# Patient Record
Sex: MALE | Race: WHITE | Employment: UNEMPLOYED | URBAN - METROPOLITAN AREA
[De-identification: names, ages, dates, MRNs, and addresses within clinical notes are randomized per-mention and may not be internally consistent; named-entity substitution may affect disease eponyms.]

---

## 2018-07-17 ENCOUNTER — OFFICE VISIT (OUTPATIENT)
Dept: INTERNAL MEDICINE CLINIC | Age: 53
End: 2018-07-17

## 2018-07-17 ENCOUNTER — HOSPITAL ENCOUNTER (OUTPATIENT)
Dept: LAB | Age: 53
Discharge: HOME | End: 2018-07-17
Payer: SELF-PAY

## 2018-07-17 VITALS
DIASTOLIC BLOOD PRESSURE: 85 MMHG | TEMPERATURE: 97.7 F | RESPIRATION RATE: 18 BRPM | BODY MASS INDEX: 19.65 KG/M2 | OXYGEN SATURATION: 99 % | HEIGHT: 67 IN | WEIGHT: 125.2 LBS | SYSTOLIC BLOOD PRESSURE: 140 MMHG | HEART RATE: 68 BPM

## 2018-07-17 DIAGNOSIS — Z00.00 ROUTINE GENERAL MEDICAL EXAMINATION AT A HEALTH CARE FACILITY: Primary | ICD-10-CM

## 2018-07-17 DIAGNOSIS — R63.4 WEIGHT LOSS: ICD-10-CM

## 2018-07-17 DIAGNOSIS — R68.82 DECREASED LIBIDO: ICD-10-CM

## 2018-07-17 DIAGNOSIS — R05.9 COUGH: ICD-10-CM

## 2018-07-17 DIAGNOSIS — Z12.11 SCREEN FOR COLON CANCER: ICD-10-CM

## 2018-07-17 DIAGNOSIS — R61 NIGHT SWEATS: ICD-10-CM

## 2018-07-17 LAB
ALBUMIN SERPL-MCNC: 4.3 G/DL (ref 3.4–5)
ALBUMIN/GLOB SERPL: 1.3 {RATIO} (ref 0.8–1.7)
ALP SERPL-CCNC: 68 U/L (ref 45–117)
ALT SERPL-CCNC: 26 U/L (ref 16–61)
ANION GAP SERPL CALC-SCNC: 7 MMOL/L (ref 3–18)
AST SERPL-CCNC: 18 U/L (ref 15–37)
BASOPHILS # BLD: 0 K/UL (ref 0–0.06)
BASOPHILS NFR BLD: 1 % (ref 0–2)
BILIRUB SERPL-MCNC: 1.3 MG/DL (ref 0.2–1)
BUN SERPL-MCNC: 19 MG/DL (ref 7–18)
BUN/CREAT SERPL: 20 (ref 12–20)
CALCIUM SERPL-MCNC: 9 MG/DL (ref 8.5–10.1)
CHLORIDE SERPL-SCNC: 104 MMOL/L (ref 100–108)
CO2 SERPL-SCNC: 30 MMOL/L (ref 21–32)
CREAT SERPL-MCNC: 0.93 MG/DL (ref 0.6–1.3)
DIFFERENTIAL METHOD BLD: NORMAL
EOSINOPHIL # BLD: 0.1 K/UL (ref 0–0.4)
EOSINOPHIL NFR BLD: 1 % (ref 0–5)
ERYTHROCYTE [DISTWIDTH] IN BLOOD BY AUTOMATED COUNT: 12.8 % (ref 11.6–14.5)
GLOBULIN SER CALC-MCNC: 3.2 G/DL (ref 2–4)
GLUCOSE SERPL-MCNC: 101 MG/DL (ref 74–99)
HCT VFR BLD AUTO: 45.7 % (ref 36–48)
HGB BLD-MCNC: 15.9 G/DL (ref 13–16)
LYMPHOCYTES # BLD: 2.7 K/UL (ref 0.9–3.6)
LYMPHOCYTES NFR BLD: 41 % (ref 21–52)
MCH RBC QN AUTO: 33.2 PG (ref 24–34)
MCHC RBC AUTO-ENTMCNC: 34.8 G/DL (ref 31–37)
MCV RBC AUTO: 95.4 FL (ref 74–97)
MONOCYTES # BLD: 0.5 K/UL (ref 0.05–1.2)
MONOCYTES NFR BLD: 8 % (ref 3–10)
NEUTS SEG # BLD: 3.2 K/UL (ref 1.8–8)
NEUTS SEG NFR BLD: 49 % (ref 40–73)
PLATELET # BLD AUTO: 195 K/UL (ref 135–420)
PMV BLD AUTO: 11.4 FL (ref 9.2–11.8)
POTASSIUM SERPL-SCNC: 4.2 MMOL/L (ref 3.5–5.5)
PROT SERPL-MCNC: 7.5 G/DL (ref 6.4–8.2)
RBC # BLD AUTO: 4.79 M/UL (ref 4.7–5.5)
SODIUM SERPL-SCNC: 141 MMOL/L (ref 136–145)
WBC # BLD AUTO: 6.4 K/UL (ref 4.6–13.2)

## 2018-07-17 PROCEDURE — 80053 COMPREHEN METABOLIC PANEL: CPT | Performed by: INTERNAL MEDICINE

## 2018-07-17 PROCEDURE — 85025 COMPLETE CBC W/AUTO DIFF WBC: CPT | Performed by: INTERNAL MEDICINE

## 2018-07-17 PROCEDURE — 84403 ASSAY OF TOTAL TESTOSTERONE: CPT | Performed by: INTERNAL MEDICINE

## 2018-07-17 PROCEDURE — 80061 LIPID PANEL: CPT | Performed by: INTERNAL MEDICINE

## 2018-07-17 PROCEDURE — 84439 ASSAY OF FREE THYROXINE: CPT | Performed by: INTERNAL MEDICINE

## 2018-07-17 RX ORDER — EMTRICITABINE AND TENOFOVIR DISOPROXIL FUMARATE 200; 300 MG/1; MG/1
1 TABLET, FILM COATED ORAL DAILY
COMMUNITY
End: 2020-05-14

## 2018-07-17 RX ORDER — IBUPROFEN 200 MG
200 TABLET ORAL
COMMUNITY

## 2018-07-17 RX ORDER — SILDENAFIL 100 MG/1
100 TABLET, FILM COATED ORAL AS NEEDED
Qty: 5 TAB | Refills: 2 | Status: SHIPPED | OUTPATIENT
Start: 2018-07-17 | End: 2018-08-21 | Stop reason: SDUPTHER

## 2018-07-17 NOTE — MR AVS SNAPSHOT
[address] [place] [address] 
 
 
 [address] [facility] [address] 
[phone] Patient: Maynor Donald MRN: [id number] :1965 Visit Information Date & Time Provider Department Dept. Phone Encounter #  
 2018  2:30 PM [facility] [phone] [id number] Follow-up Instructions Return in about 1 month (around 2018) for reveiw lab, new med, and weight. Upcoming Health Maintenance Date Due Hepatitis C Screening 1965 FOBT Q 1 YEAR AGE 50-75 2014 Influenza Age 5 to Adult 2018 DTaP/Tdap/Td series (2 - Td) 2019 Allergies as of 2018  Review Complete On: 2018 By: Porsha Hill MD  
 No Known Allergies Current Immunizations  Reviewed on 2018 Name Date Hep B Vaccine 2005 Tdap 2009 Reviewed by Redd Harrell PHARMD on 2018 at 10:31 AM  
You Were Diagnosed With   
  
 Codes Comments Weight loss    -  Primary ICD-10-CM: R63.4 ICD-9-CM: 783.21 Night sweats     ICD-10-CM: R61 
ICD-9-CM: 780.8 Decreased libido     ICD-10-CM: D38.59 
ICD-9-CM: 799.81 Cough     ICD-10-CM: R05 ICD-9-CM: 392. 2 Vitals BP Pulse Temp Resp Height(growth percentile) Weight(growth percentile) 140/85 (BP 1 Location: Right arm, BP Patient Position: Sitting) 68 97.7 °F (36.5 °C) (Oral) 18 5' 7\" (1.702 m) 125 lb 3.2 oz (56.8 kg) SpO2 BMI Smoking Status 99% 19.61 kg/m2 Never Smoker Vitals History BMI and BSA Data Body Mass Index Body Surface Area  
 19.61 kg/m 2 1.64 m 2 Preferred Pharmacy Pharmacy Name Phone [address] [place] [address] [phone] Your Updated Medication List  
  
   
This list is accurate as of 18  3:09 PM.  Always use your most recent med list.  
  
  
  
  
 ibuprofen 200 mg tablet Commonly known as:  MOTRIN  
 Take 200 mg by mouth every six (6) hours as needed for Pain.  
  
 sildenafil citrate 100 mg tablet Commonly known as:  VIAGRA Take 1 Tab by mouth as needed. Indications: Erectile Dysfunction TRUVADA 200-300 mg per tablet Generic drug:  emtricitabine-tenofovir (TDF) Take 1 Tab by mouth daily. Prescriptions Printed Refills  
 sildenafil citrate (VIAGRA) 100 mg tablet 2 Sig: Take 1 Tab by mouth as needed. Indications: Erectile Dysfunction Class: Print Route: Oral  
  
Follow-up Instructions Return in about 1 month (around 8/17/2018) for reveiw lab, new med, and weight. To-Do List   
 07/17/2018 Lab:  CBC WITH AUTOMATED DIFF   
  
 07/17/2018 Lab:  LIPID PANEL   
  
 07/17/2018 Lab:  METABOLIC PANEL, COMPREHENSIVE   
  
 07/17/2018 Lab:  QUANTIFERON TB GOLD(CLIENT INCUB.)   
  
 07/17/2018 Lab:  TESTOSTERONE, FREE & TOTAL   
  
 07/17/2018 Lab:  TSH AND FREE T4   
  
 07/17/2018 Imaging:  XR CHEST PA LAT Introducing [facility]! Sandi Fletcher introduces [employer] patient portal. Now you can access parts of your medical record, email your doctor's office, and request medication refills online. 1. In your internet browser, go to [email or web address] 2. Click on the First Time User? Click Here link in the Sign In box. You will see the New Member Sign Up page. 3. Enter your [employer] Access Code exactly as it appears below. You will not need to use this code after youve completed the sign-up process. If you do not sign up before the expiration date, you must request a new code. · [employer] Access Code: [email or web address] Expires: 10/15/2018  3:09 PM 
 
4. Enter the last four digits of your Social Security Number (xxxx) and Date of Birth (mm/dd/yyyy) as indicated and click Submit. You will be taken to the next sign-up page. 5. Create a [employer] ID.  This will be your [employer] login ID and cannot be changed, so think of one that is secure and easy to remember. 6. Create a [employer] password. You can change your password at any time. 7. Enter your Password Reset Question and Answer. This can be used at a later time if you forget your password. 8. Enter your e-mail address. You will receive e-mail notification when new information is available in [address] 9. Click Sign Up. You can now view and download portions of your medical record. 10. Click the Download Summary menu link to download a portable copy of your medical information. If you have questions, please visit the Frequently Asked Questions section of the [employer] website. Remember, [employer] is NOT to be used for urgent needs. For medical emergencies, dial 911. Now available from your iPhone and Android! Please provide this summary of care documentation to your next provider. If you have any questions after today's visit, please call [phone].

## 2018-07-17 NOTE — PROGRESS NOTES
ROOM # 4    Noa Flynn presents today for   Chief Complaint   Patient presents with   [address] Weight Loss    Sweats       Noa Flynn preferred language for health care discussion is english/other. Is someone accompanying this pt? no    Is the patient using any DME equipment during OV? no    Depression Screening:  PHQ over the last two weeks 7/17/2018   Little interest or pleasure in doing things Not at all   Feeling down, depressed or hopeless Not at all   Total Score PHQ 2 0       Learning Assessment:  Learning Assessment 7/17/2018   PRIMARY LEARNER Patient   HIGHEST LEVEL OF EDUCATION - PRIMARY LEARNER  SOME COLLEGE   BARRIERS PRIMARY LEARNER NONE   CO-LEARNER CAREGIVER No   PRIMARY LANGUAGE ENGLISH   LEARNER PREFERENCE PRIMARY VIDEOS   ANSWERED BY Patient   RELATIONSHIP SELF       Abuse Screening:  Abuse Screening Questionnaire 7/17/2018   Do you ever feel afraid of your partner? N   Are you in a relationship with someone who physically or mentally threatens you? N   Is it safe for you to go home? Y       Fall Risk  Fall Risk Assessment, last 12 mths 7/17/2018   Able to walk? Yes   Fall in past 12 months? No       Health Maintenance reviewed and discussed per provider. Yes    Noa Flynn is due for   Health Maintenance Due   Topic Date Due    Hepatitis C Screening  1965    FOBT Q 1 YEAR AGE 50-75  08/18/2014     Please order/place referral if appropriate. Advance Directive:  1. Do you have an advance directive in place? Patient Reply: no    2. If not, would you like material regarding how to put one in place? Patient Reply: no    Coordination of Care:  1. Have you been to the ER, urgent care clinic since your last visit? Hospitalized since your last visit? no    2. Have you seen or consulted any other health care providers outside of the [address] [place] [address] since your last visit? Include any pap smears or colon screening.  no

## 2018-07-18 LAB
CHOLEST SERPL-MCNC: 132 MG/DL
HDLC SERPL-MCNC: 65 MG/DL (ref 40–60)
HDLC SERPL: 2 {RATIO} (ref 0–5)
LDLC SERPL CALC-MCNC: 57.8 MG/DL (ref 0–100)
LIPID PROFILE,FLP: ABNORMAL
T4 FREE SERPL-MCNC: 1.3 NG/DL (ref 0.7–1.5)
TRIGL SERPL-MCNC: 46 MG/DL (ref ?–150)
TSH SERPL DL<=0.05 MIU/L-ACNC: 1.4 UIU/ML (ref 0.36–3.74)
VLDLC SERPL CALC-MCNC: 9.2 MG/DL

## 2018-07-19 ENCOUNTER — HOSPITAL ENCOUNTER (OUTPATIENT)
Dept: GENERAL RADIOLOGY | Age: 53
Discharge: HOME | End: 2018-07-19
Payer: SELF-PAY

## 2018-07-19 DIAGNOSIS — R63.4 WEIGHT LOSS: ICD-10-CM

## 2018-07-19 DIAGNOSIS — R05.9 COUGH: ICD-10-CM

## 2018-07-19 DIAGNOSIS — R61 NIGHT SWEATS: ICD-10-CM

## 2018-07-19 PROCEDURE — 71046 X-RAY EXAM CHEST 2 VIEWS: CPT

## 2018-07-21 LAB
TESTOST FREE SERPL-MCNC: 12.1 PG/ML (ref 7.2–24)
TESTOST SERPL-MCNC: 362 NG/DL (ref 264–916)

## 2018-07-23 ENCOUNTER — TELEPHONE (OUTPATIENT)
Dept: INTERNAL MEDICINE CLINIC | Age: 53
End: 2018-07-23

## 2018-07-23 NOTE — TELEPHONE ENCOUNTER
Incoming call from pt. 2 pt identifiers confirmed. Pt notified that xray looked fine and letter was sent to him about this from Dr. Desiree Bower. Also notified that testosterone was fine and most of other labs looked fine as well. Advised that Dr. Desiree Bower will be sending him a letter regarding lab results. Pt verbalized understanding of all information. No further questions or concerns at this time.

## 2018-07-23 NOTE — TELEPHONE ENCOUNTER
Xray report is in the mail. It was normal  The remainder of his lab looked good. I was waiting on the quantiferon before sending it. Since it is delayed, I will send a letter soon.   His testosterone was fine

## 2018-07-23 NOTE — TELEPHONE ENCOUNTER
----- Message from Osmar Huizar MD sent at 7/20/2018  3:56 PM EDT -----  Regarding: quantiferon  Any idea what happened re this quantiferon?  ----- Message -----     From: Edward Ledezma In [place]     Sent: 7/17/2018   9:29 PM       To:  Osmar Huizar MD

## 2018-07-23 NOTE — TELEPHONE ENCOUNTER
Spoke with staff that was in office that day, correct tubes for Quantiferon test were not collected as nurse was unaware. Contacted pt. 2 pt identifiers confirmed. Pt notified needs to come in to have test drawn. Pt verbalized understanding and states will be in the office within 2 weeks to have this done. Verbalized understanding. No further questions or concerns at this time.

## 2018-07-24 ENCOUNTER — HOSPITAL ENCOUNTER (OUTPATIENT)
Dept: LAB | Age: 53
Discharge: HOME | End: 2018-07-24
Payer: SELF-PAY

## 2018-07-24 DIAGNOSIS — Z12.11 SCREEN FOR COLON CANCER: ICD-10-CM

## 2018-07-24 PROCEDURE — 82274 ASSAY TEST FOR BLOOD FECAL: CPT | Performed by: INTERNAL MEDICINE

## 2018-07-30 ENCOUNTER — HOSPITAL ENCOUNTER (OUTPATIENT)
Dept: LAB | Age: 53
Discharge: HOME | End: 2018-07-30
Payer: SELF-PAY

## 2018-07-30 ENCOUNTER — TELEPHONE (OUTPATIENT)
Dept: INTERNAL MEDICINE CLINIC | Age: 53
End: 2018-07-30

## 2018-07-30 DIAGNOSIS — Z11.1 SCREENING FOR TUBERCULOSIS: Primary | ICD-10-CM

## 2018-07-30 DIAGNOSIS — R61 UNEXPLAINED NIGHT SWEATS: ICD-10-CM

## 2018-07-30 DIAGNOSIS — Z11.1 SCREENING FOR TUBERCULOSIS: ICD-10-CM

## 2018-07-30 PROCEDURE — 86480 TB TEST CELL IMMUN MEASURE: CPT | Performed by: INTERNAL MEDICINE

## 2018-07-30 PROCEDURE — 36415 COLL VENOUS BLD VENIPUNCTURE: CPT | Performed by: INTERNAL MEDICINE

## 2018-07-31 LAB — HEMOCCULT STL QL IA: NEGATIVE

## 2018-08-02 LAB
ANNOTATION COMMENT IMP: NORMAL
M TB IFN-G CD4+ BCKGRND COR BLD-ACNC: 0 IU/ML
M TB IFN-G CD4+ T-CELLS BLD-ACNC: 0.02 IU/ML
M TB TUBERC IFN-G BLD QL: NEGATIVE
M TB TUBERC IGNF/MITOGEN IGNF CONTROL: >10 IU/ML
QUANTIFERON NIL VALUE: 0.02 IU/ML
SERVICE CMNT-IMP: NORMAL

## 2018-08-21 ENCOUNTER — OFFICE VISIT (OUTPATIENT)
Dept: INTERNAL MEDICINE CLINIC | Age: 53
End: 2018-08-21

## 2018-08-21 VITALS
WEIGHT: 127.6 LBS | SYSTOLIC BLOOD PRESSURE: 123 MMHG | OXYGEN SATURATION: 96 % | RESPIRATION RATE: 18 BRPM | BODY MASS INDEX: 20.03 KG/M2 | HEIGHT: 67 IN | DIASTOLIC BLOOD PRESSURE: 76 MMHG | HEART RATE: 74 BPM | TEMPERATURE: 97.7 F

## 2018-08-21 DIAGNOSIS — R68.82 DECREASED LIBIDO: ICD-10-CM

## 2018-08-21 DIAGNOSIS — R63.4 WEIGHT LOSS: Primary | ICD-10-CM

## 2018-08-21 DIAGNOSIS — M53.3 SI (SACROILIAC) JOINT DYSFUNCTION: ICD-10-CM

## 2018-08-21 DIAGNOSIS — K21.9 GASTROESOPHAGEAL REFLUX DISEASE, ESOPHAGITIS PRESENCE NOT SPECIFIED: ICD-10-CM

## 2018-08-21 RX ORDER — SILDENAFIL 100 MG/1
100 TABLET, FILM COATED ORAL AS NEEDED
Qty: 15 TAB | Refills: 2 | Status: SHIPPED | OUTPATIENT
Start: 2018-08-21 | End: 2019-06-06

## 2018-08-21 NOTE — PROGRESS NOTES
ROOM # 6    Foster Nunes presents today for   Chief Complaint   Patient presents with    Results    Weight Loss       Foster Nunes preferred language for health care discussion is english/other. Is someone accompanying this pt? no    Is the patient using any DME equipment during OV? no    Depression Screening:  PHQ over the last two weeks 7/17/2018   Little interest or pleasure in doing things Not at all   Feeling down, depressed, irritable, or hopeless Not at all   Total Score PHQ 2 0       Learning Assessment:  Learning Assessment 7/17/2018   PRIMARY LEARNER Patient   HIGHEST LEVEL OF EDUCATION - PRIMARY LEARNER  SOME COLLEGE   BARRIERS PRIMARY LEARNER NONE   CO-LEARNER CAREGIVER No   PRIMARY LANGUAGE ENGLISH   LEARNER PREFERENCE PRIMARY VIDEOS   ANSWERED BY Patient   RELATIONSHIP SELF       Abuse Screening:  Abuse Screening Questionnaire 7/17/2018   Do you ever feel afraid of your partner? N   Are you in a relationship with someone who physically or mentally threatens you? N   Is it safe for you to go home? Y       Fall Risk  Fall Risk Assessment, last 12 mths 7/17/2018   Able to walk? Yes   Fall in past 12 months? No       Health Maintenance reviewed and discussed per provider. Yes    Foster Nuens is due for   Health Maintenance Due   Topic Date Due    Hepatitis C Screening  1965    Influenza Age 5 to Adult  08/01/2018     Please order/place referral if appropriate. Advance Directive:  1. Do you have an advance directive in place? Patient Reply: no    2. If not, would you like material regarding how to put one in place? Patient Reply: no    Coordination of Care:  1. Have you been to the ER, urgent care clinic since your last visit? Hospitalized since your last visit? no    2. Have you seen or consulted any other health care providers outside of the [facility] since your last visit? Include any pap smears or colon screening.  no

## 2018-08-21 NOTE — PROGRESS NOTES
HISTORY OF PRESENT ILLNESS  Yessica Suazo is a 46 y.o. male. Visit Vitals    /76 (BP 1 Location: Right arm, BP Patient Position: Sitting)    Pulse 74    Temp 97.7 °F (36.5 °C) (Oral)    Resp 18    Ht 5' 7\" (1.702 m)    Wt 127 lb 9.6 oz (57.9 kg)    SpO2 96%    BMI 19.98 kg/m2       HPI Comments: He is still concerned about weight loss-but today he is up 2 #  He is increasing his proteins and trying to work out some. But he feels he is not getting muscle definition. He has been cutting out a lot of fat and empty calories. So this may have contributed to some weight loss. He does report that the full dosage of viagra has helped some. He wants to continue the dose    Some back pain, right side. Sometimes small movements bother it more. Denies injury. Does do a lot of bending. Does not relate it to anything else. Results   The history is provided by the patient. This is a new problem. Weight Loss   The history is provided by the patient (weight up 2 pounds. ). This is a chronic problem. The current episode started more than 1 week ago. The problem occurs daily. Back Pain    The history is provided by the patient. This is a new problem. The current episode started more than 1 week ago. The problem has not changed since onset. The problem occurs daily. The pain is associated with no known injury. The pain is present in the lumbar spine. The quality of the pain is described as aching and burning. The pain is mild. The symptoms are aggravated by certain positions. Review of Systems   Constitutional: Negative. Gastrointestinal:        He has a lot of reflux sxs off and on. Occasionally takes an antacid. Never has taken PPIs. Genitourinary: Negative. Musculoskeletal: Positive for back pain. Neurological: Negative for sensory change. Physical Exam   Constitutional: He is oriented to person, place, and time. He appears well-developed and well-nourished. No distress. Cardiovascular: Normal rate. Pulmonary/Chest: Effort normal.   Musculoskeletal:   ? Mild discomfort left SI joint james with left lateral bend. Negative Sciatic notch. NML LE strength   Neurological: He is alert and oriented to person, place, and time. Skin: Skin is warm and dry. He is not diaphoretic. Psychiatric: He has a normal mood and affect. Nursing note and vitals reviewed. ASSESSMENT and PLAN    ICD-10-CM ICD-9-CM    1. Weight loss R63.4 783.21 XR UPPER GI AIR CONT WO KUB   2. Gastroesophageal reflux disease, esophagitis presence not specified K21.9 530.81 XR UPPER GI AIR CONT WO KUB   3. Decreased libido R68.82 799.81 sildenafil citrate (VIAGRA) 100 mg tablet   4. SI (sacroiliac) joint dysfunction M53.3 724.6      Wt now stable. discussed with pt, will order UGI. May need eventual GI referral. But currently is uninsured    Refill as noted    discussed SI joint dysfunction and stretching. This is likely the cause of his low back pain.  Patient educational information provided    F/u 6 weeks for recheck

## 2018-08-21 NOTE — PATIENT INSTRUCTIONS
Sacroiliac Pain: Exercises  Your Care Instructions  Here are some examples of typical rehabilitation exercises for your condition. Start each exercise slowly. Ease off the exercise if you start to have pain. Your doctor or physical therapist will tell you when you can start these exercises and which ones will work best for you. How to do the exercises  Knee-to-chest stretch    1. Do not do the knee-to-chest exercise if it causes or increases back or leg pain. 2. Lie on your back with your knees bent and your feet flat on the floor. You can put a small pillow under your head and neck if it is more comfortable. 3. Grasp your hands under one knee and bring the knee to your chest, keeping the other foot flat on the floor. 4. Keep your lower back pressed to the floor. Hold for at least 15 to 30 seconds. 5. Relax and lower the knee to the starting position. Repeat with the other leg. 6. Repeat 2 to 4 times with each leg. 7. To get more stretch, keep your other leg flat on the floor while pulling your knee to your chest.  Bridging    1. Lie on your back with both knees bent. Your knees should be bent about 90 degrees. 2. Tighten your belly muscles by pulling in your belly button toward your spine. Then push your feet into the floor, squeeze your buttocks, and lift your hips off the floor until your shoulders, hips, and knees are all in a straight line. 3. Hold for about 6 seconds as you continue to breathe normally, and then slowly lower your hips back down to the floor and rest for up to 10 seconds. 4. Repeat 8 to 12 times. Hip extension    1. Get down on your hands and knees on the floor. 2. Keeping your back and neck straight, lift one leg straight out behind you. When you lift your leg, keep your hips level. Don't let your back twist, and don't let your hip drop toward the floor. 3. Hold for 6 seconds. Repeat 8 to 12 times with each leg.   4. If you feel steady and strong when you do this exercise, you can make it more difficult. To do this, when you lift your leg, also lift the opposite arm straight out in front of you. For example, lift the left leg and the right arm at the same time. (This is sometimes called the \"bird dog exercise. \") Hold for 6 seconds, and repeat 8 to 12 times on each side. Clamshell    1. Lie on your side with a pillow under your head. Keep your feet and knees together and your knees bent. 2. Raise your top knee, but keep your feet together. Do not let your hips roll back. Your legs should open up like a clamshell. 3. Hold for 6 seconds. 4. Slowly lower your knee back down. Rest for 10 seconds. 5. Repeat 8 to 12 times. 6. Switch to your other side and repeat steps 1 through 5. Hamstring wall stretch    1. Lie on your back in a doorway, with one leg through the open door. 2. Slide your affected leg up the wall to straighten your knee. You should feel a gentle stretch down the back of your leg. 1. Do not arch your back. 2. Do not bend either knee. 3. Keep one heel touching the floor and the other heel touching the wall. Do not point your toes. 3. Hold the stretch for at least 1 minute to begin. Then try to lengthen the time you hold the stretch to as long as 6 minutes. 4. Switch legs, and repeat steps 1 through 3.  5. Repeat 2 to 4 times. 6. If you do not have a place to do this exercise in a doorway, there is another way to do it:  7. Lie on your back, and bend one knee. 8. Loop a towel under the ball and toes of that foot, and hold the ends of the towel in your hands. 9. Straighten your knee, and slowly pull back on the towel. You should feel a gentle stretch down the back of your leg. 10. Switch legs, and repeat steps 1 through 3.  11. Repeat 2 to 4 times. Lower abdominal strengthening    1. Lie on your back with your knees bent and your feet flat on the floor. 2. Tighten your belly muscles by pulling your belly button in toward your spine.   3. Lift one foot off the floor and bring your knee toward your chest, so that your knee is straight above your hip and your leg is bent like the letter \"L. \"  4. Lift the other knee up to the same position. 5. Lower one leg at a time to the starting position. 6. Keep alternating legs until you have lifted each leg 8 to 12 times. 7. Be sure to keep your belly muscles tight and your back still as you are moving your legs. Be sure to breathe normally. Piriformis stretch    1. Lie on your back with your legs straight. 2. Lift your affected leg, and bend your knee. With your opposite hand, reach across your body, and then gently pull your knee toward your opposite shoulder. 3. Hold the stretch for 15 to 30 seconds. 4. Switch legs and repeat steps 1 through 3.  5. Repeat 2 to 4 times. Follow-up care is a key part of your treatment and safety. Be sure to make and go to all appointments, and call your doctor if you are having problems. It's also a good idea to know your test results and keep a list of the medicines you take. Where can you learn more? Go to http://thao-derik.info/. Enter I565 in the search box to learn more about \"Sacroiliac Pain: Exercises. \"  Current as of: November 29, 2017  Content Version: 11.7  © 2602-1007 [employer]. Care instructions adapted under license by [employer] (which disclaims liability or warranty for this information). If you have questions about a medical condition or this instruction, always ask your healthcare professional. [address] any warranty or liability for your use of this information.

## 2018-08-21 NOTE — MR AVS SNAPSHOT
[address] [place] [address] 
 
 
 [address] [facility] [id number] 
[phone] Patient: Nelly Lopez MRN: [id number] :1965 Visit Information Date & Time Provider Department Dept. Phone Encounter #  
 2018  1:45 PM [phone] [id number] Follow-up Instructions Return in about 6 weeks (around 10/2/2018) for rechek weight, back pain. Upcoming Health Maintenance Date Due Hepatitis C Screening 1965 Influenza Age 5 to Adult 2018 FOBT Q 1 YEAR AGE 50-75 2019 DTaP/Tdap/Td series (2 - Td) 2019 Allergies as of 2018  Review Complete On: 2018 By: Jeffrey Watters MD  
 No Known Allergies Current Immunizations  Reviewed on 2018 Name Date Hep B Vaccine 2005 Tdap 2009 12:00 AM  
  
 Not reviewed this visit You Were Diagnosed With   
  
 Codes Comments Weight loss    -  Primary ICD-10-CM: R63.4 ICD-9-CM: 783.21 Gastroesophageal reflux disease, esophagitis presence not specified     ICD-10-CM: K21.9 ICD-9-CM: 530.81 Decreased libido     ICD-10-CM: R68.82 
ICD-9-CM: 799.81   
 SI (sacroiliac) joint dysfunction     ICD-10-CM: M53.3 ICD-9-CM: 724.6 Vitals BP Pulse Temp Resp Height(growth percentile) Weight(growth percentile) 123/76 (BP 1 Location: Right arm, BP Patient Position: Sitting) 74 97.7 °F (36.5 °C) (Oral) 18 5' 7\" (1.702 m) 127 lb 9.6 oz (57.9 kg) SpO2 BMI Smoking Status 96% 19.98 kg/m2 Never Smoker Vitals History BMI and BSA Data Body Mass Index Body Surface Area 19.98 kg/m 2 1.65 m 2 Preferred Pharmacy Pharmacy Name Phone [address] [place] [address] [phone] Your Updated Medication List  
  
   
This list is accurate as of 18  2:22 PM.  Always use your most recent med list.  
  
  
  
  
 ibuprofen 200 mg tablet Commonly known as:  MOTRIN Take 200 mg by mouth every six (6) hours as needed for Pain.  
  
 sildenafil citrate 100 mg tablet Commonly known as:  VIAGRA Take 1 Tab by mouth as needed. Indications: Erectile Dysfunction TRUVADA 200-300 mg per tablet Generic drug:  emtricitabine-tenofovir (TDF) Take 1 Tab by mouth daily. Prescriptions Printed Refills  
 sildenafil citrate (VIAGRA) 100 mg tablet 2 Sig: Take 1 Tab by mouth as needed. Indications: Erectile Dysfunction Class: Print Route: Oral  
  
Follow-up Instructions Return in about 6 weeks (around 10/2/2018) for rechek weight, back pain. To-Do List   
 08/21/2018 Imaging:  XR UPPER GI AIR CONT WO KUB Patient Instructions Sacroiliac Pain: Exercises Your Care Instructions Here are some examples of typical rehabilitation exercises for your condition. Start each exercise slowly. Ease off the exercise if you start to have pain. Your doctor or physical therapist will tell you when you can start these exercises and which ones will work best for you. How to do the exercises Knee-to-chest stretch 1. Do not do the knee-to-chest exercise if it causes or increases back or leg pain. 2. Lie on your back with your knees bent and your feet flat on the floor. You can put a small pillow under your head and neck if it is more comfortable. 3. Grasp your hands under one knee and bring the knee to your chest, keeping the other foot flat on the floor. 4. Keep your lower back pressed to the floor. Hold for at least 15 to 30 seconds. 5. Relax and lower the knee to the starting position. Repeat with the other leg. 6. Repeat 2 to 4 times with each leg. 7. To get more stretch, keep your other leg flat on the floor while pulling your knee to your chest. 
Bridging 1. Lie on your back with both knees bent. Your knees should be bent about 90 degrees. 2. Tighten your belly muscles by pulling in your belly button toward your spine. Then push your feet into the floor, squeeze your buttocks, and lift your hips off the floor until your shoulders, hips, and knees are all in a straight line. 3. Hold for about 6 seconds as you continue to breathe normally, and then slowly lower your hips back down to the floor and rest for up to 10 seconds. 4. Repeat 8 to 12 times. Hip extension 1. Get down on your hands and knees on the floor. 2. Keeping your back and neck straight, lift one leg straight out behind you. When you lift your leg, keep your hips level. Don't let your back twist, and don't let your hip drop toward the floor. 3. Hold for 6 seconds. Repeat 8 to 12 times with each leg. 4. If you feel steady and strong when you do this exercise, you can make it more difficult. To do this, when you lift your leg, also lift the opposite arm straight out in front of you. For example, lift the left leg and the right arm at the same time. (This is sometimes called the \"bird dog exercise. \") Hold for 6 seconds, and repeat 8 to 12 times on each side. Clamshell 1. Lie on your side with a pillow under your head. Keep your feet and knees together and your knees bent. 2. Raise your top knee, but keep your feet together. Do not let your hips roll back. Your legs should open up like a clamshell. 3. Hold for 6 seconds. 4. Slowly lower your knee back down. Rest for 10 seconds. 5. Repeat 8 to 12 times. 6. Switch to your other side and repeat steps 1 through 5. Hamstring wall stretch 1. Lie on your back in a doorway, with one leg through the open door. 2. Slide your affected leg up the wall to straighten your knee. You should feel a gentle stretch down the back of your leg. 1. Do not arch your back. 2. Do not bend either knee. 3. Keep one heel touching the floor and the other heel touching the wall. Do not point your toes. 3. Hold the stretch for at least 1 minute to begin. Then try to lengthen the time you hold the stretch to as long as 6 minutes. 4. Switch legs, and repeat steps 1 through 3. 
5. Repeat 2 to 4 times. 6. If you do not have a place to do this exercise in a doorway, there is another way to do it: 
7. Lie on your back, and bend one knee. 8. Loop a towel under the ball and toes of that foot, and hold the ends of the towel in your hands. 9. Straighten your knee, and slowly pull back on the towel. You should feel a gentle stretch down the back of your leg. 10. Switch legs, and repeat steps 1 through 3. 
11. Repeat 2 to 4 times. Lower abdominal strengthening 1. Lie on your back with your knees bent and your feet flat on the floor. 2. Tighten your belly muscles by pulling your belly button in toward your spine. 3. Lift one foot off the floor and bring your knee toward your chest, so that your knee is straight above your hip and your leg is bent like the letter \"L. \" 
4. Lift the other knee up to the same position. 5. Lower one leg at a time to the starting position. 6. Keep alternating legs until you have lifted each leg 8 to 12 times. 7. Be sure to keep your belly muscles tight and your back still as you are moving your legs. Be sure to breathe normally. Piriformis stretch 1. Lie on your back with your legs straight. 2. Lift your affected leg, and bend your knee. With your opposite hand, reach across your body, and then gently pull your knee toward your opposite shoulder. 3. Hold the stretch for 15 to 30 seconds. 4. Switch legs and repeat steps 1 through 3. 
5. Repeat 2 to 4 times. Follow-up care is a key part of your treatment and safety. Be sure to make and go to all appointments, and call your doctor if you are having problems. It's also a good idea to know your test results and keep a list of the medicines you take. Where can you learn more? Go to http://thao-derik.info/. Enter Z819 in the search box to learn more about \"Sacroiliac Pain: Exercises. \" Current as of: November 29, 2017 Content Version: 11.7 © 0215-7350 [employer]. Care instructions adapted under license by [employer] (which disclaims liability or warranty for this information). If you have questions about a medical condition or this instruction, always ask your healthcare professional. [address] any warranty or liability for your use of this information. Introducing [facility] SERVICES! Dear Merissa Gates: Thank you for requesting a [facility] [employer] account. Our records indicate that you already have an active [employer] account. You can access your account anytime at [email or web address] Did you know that you can access your hospital and ER discharge instructions at any time in [employer]? You can also review all of your test results from your hospital stay or ER visit. Additional Information If you have questions, please visit the Frequently Asked Questions section of the [employer] website at [email or web address] Remember, [facility] is NOT to be used for urgent needs. For medical emergencies, dial 911. Now available from your iPhone and Android! Please provide this summary of care documentation to your next provider. Your primary care clinician is listed as [facility]. If you have any questions after today's visit, please call [phone].

## 2018-09-07 ENCOUNTER — HOSPITAL ENCOUNTER (OUTPATIENT)
Dept: GENERAL RADIOLOGY | Age: 53
Discharge: HOME | End: 2018-09-07
Attending: INTERNAL MEDICINE
Payer: SELF-PAY

## 2018-09-07 DIAGNOSIS — R63.4 WEIGHT LOSS: ICD-10-CM

## 2018-09-07 DIAGNOSIS — K21.9 GASTROESOPHAGEAL REFLUX DISEASE, ESOPHAGITIS PRESENCE NOT SPECIFIED: ICD-10-CM

## 2018-09-07 PROCEDURE — 74246 X-RAY XM UPR GI TRC 2CNTRST: CPT

## 2018-09-07 PROCEDURE — 74011000255 HC RX REV CODE- 255: Performed by: INTERNAL MEDICINE

## 2018-09-07 PROCEDURE — 74011000250 HC RX REV CODE- 250: Performed by: INTERNAL MEDICINE

## 2018-09-07 RX ADMIN — BARIUM SULFATE 176 G: 960 POWDER, FOR SUSPENSION ORAL at 08:10

## 2018-09-07 RX ADMIN — BARIUM SULFATE 135 ML: 980 POWDER, FOR SUSPENSION ORAL at 08:10

## 2018-09-07 RX ADMIN — ANTACID/ANTIFLATULENT 4 G: 380; 550; 10; 10 GRANULE, EFFERVESCENT ORAL at 08:10

## 2018-11-28 DIAGNOSIS — R68.82 DECREASED LIBIDO: ICD-10-CM

## 2019-06-06 ENCOUNTER — OFFICE VISIT (OUTPATIENT)
Dept: INTERNAL MEDICINE CLINIC | Age: 54
End: 2019-06-06

## 2019-06-06 ENCOUNTER — HOSPITAL ENCOUNTER (OUTPATIENT)
Dept: LAB | Age: 54
Discharge: HOME | End: 2019-06-06
Payer: SELF-PAY

## 2019-06-06 VITALS
DIASTOLIC BLOOD PRESSURE: 80 MMHG | OXYGEN SATURATION: 96 % | HEART RATE: 72 BPM | RESPIRATION RATE: 16 BRPM | SYSTOLIC BLOOD PRESSURE: 125 MMHG | TEMPERATURE: 98 F | HEIGHT: 67 IN | WEIGHT: 128 LBS | BODY MASS INDEX: 20.09 KG/M2

## 2019-06-06 DIAGNOSIS — R07.89 CHEST WALL PAIN: ICD-10-CM

## 2019-06-06 DIAGNOSIS — R05.9 COUGH: Primary | ICD-10-CM

## 2019-06-06 DIAGNOSIS — R68.82 LOW LIBIDO: ICD-10-CM

## 2019-06-06 DIAGNOSIS — R63.4 WEIGHT LOSS: ICD-10-CM

## 2019-06-06 DIAGNOSIS — F32.A DEPRESSION, UNSPECIFIED DEPRESSION TYPE: ICD-10-CM

## 2019-06-06 DIAGNOSIS — F41.9 ANXIETY: ICD-10-CM

## 2019-06-06 LAB
ALBUMIN SERPL-MCNC: 4.2 G/DL (ref 3.4–5)
ALBUMIN/GLOB SERPL: 1.3 {RATIO} (ref 0.8–1.7)
ALP SERPL-CCNC: 83 U/L (ref 45–117)
ALT SERPL-CCNC: 25 U/L (ref 16–61)
ANION GAP SERPL CALC-SCNC: 8 MMOL/L (ref 3–18)
AST SERPL-CCNC: 15 U/L (ref 15–37)
BASOPHILS # BLD: 0 K/UL (ref 0–0.1)
BASOPHILS NFR BLD: 0 % (ref 0–2)
BILIRUB SERPL-MCNC: 1.1 MG/DL (ref 0.2–1)
BUN SERPL-MCNC: 20 MG/DL (ref 7–18)
BUN/CREAT SERPL: 24 (ref 12–20)
CALCIUM SERPL-MCNC: 8.7 MG/DL (ref 8.5–10.1)
CHLORIDE SERPL-SCNC: 102 MMOL/L (ref 100–108)
CK SERPL-CCNC: 125 U/L (ref 39–308)
CO2 SERPL-SCNC: 30 MMOL/L (ref 21–32)
CREAT SERPL-MCNC: 0.82 MG/DL (ref 0.6–1.3)
DIFFERENTIAL METHOD BLD: ABNORMAL
EOSINOPHIL # BLD: 0.2 K/UL (ref 0–0.4)
EOSINOPHIL NFR BLD: 2 % (ref 0–5)
ERYTHROCYTE [DISTWIDTH] IN BLOOD BY AUTOMATED COUNT: 13 % (ref 11.6–14.5)
GLOBULIN SER CALC-MCNC: 3.2 G/DL (ref 2–4)
GLUCOSE SERPL-MCNC: 88 MG/DL (ref 74–99)
HCT VFR BLD AUTO: 44.7 % (ref 36–48)
HGB BLD-MCNC: 14.8 G/DL (ref 13–16)
LYMPHOCYTES # BLD: 2.5 K/UL (ref 0.9–3.6)
LYMPHOCYTES NFR BLD: 30 % (ref 21–52)
MCH RBC QN AUTO: 32.5 PG (ref 24–34)
MCHC RBC AUTO-ENTMCNC: 33.1 G/DL (ref 31–37)
MCV RBC AUTO: 98 FL (ref 74–97)
MONOCYTES # BLD: 0.7 K/UL (ref 0.05–1.2)
MONOCYTES NFR BLD: 8 % (ref 3–10)
NEUTS SEG # BLD: 4.9 K/UL (ref 1.8–8)
NEUTS SEG NFR BLD: 60 % (ref 40–73)
PLATELET # BLD AUTO: 254 K/UL (ref 135–420)
PMV BLD AUTO: 10.5 FL (ref 9.2–11.8)
POTASSIUM SERPL-SCNC: 4.2 MMOL/L (ref 3.5–5.5)
PROT SERPL-MCNC: 7.4 G/DL (ref 6.4–8.2)
RBC # BLD AUTO: 4.56 M/UL (ref 4.7–5.5)
SODIUM SERPL-SCNC: 140 MMOL/L (ref 136–145)
T4 FREE SERPL-MCNC: 1.3 NG/DL (ref 0.7–1.5)
TSH SERPL DL<=0.05 MIU/L-ACNC: 1 UIU/ML (ref 0.36–3.74)
WBC # BLD AUTO: 8.3 K/UL (ref 4.6–13.2)

## 2019-06-06 PROCEDURE — 82550 ASSAY OF CK (CPK): CPT

## 2019-06-06 PROCEDURE — 85025 COMPLETE CBC W/AUTO DIFF WBC: CPT

## 2019-06-06 PROCEDURE — 84403 ASSAY OF TOTAL TESTOSTERONE: CPT

## 2019-06-06 PROCEDURE — 80053 COMPREHEN METABOLIC PANEL: CPT

## 2019-06-06 PROCEDURE — 36415 COLL VENOUS BLD VENIPUNCTURE: CPT

## 2019-06-06 PROCEDURE — 82784 ASSAY IGA/IGD/IGG/IGM EACH: CPT

## 2019-06-06 PROCEDURE — 84439 ASSAY OF FREE THYROXINE: CPT

## 2019-06-06 RX ORDER — TADALAFIL 10 MG/1
TABLET ORAL
Qty: 10 TAB | Refills: 5 | Status: SHIPPED | OUTPATIENT
Start: 2019-06-06 | End: 2019-06-06 | Stop reason: SDUPTHER

## 2019-06-06 RX ORDER — ACETAMINOPHEN 500 MG
TABLET ORAL
COMMUNITY

## 2019-06-06 RX ORDER — VENLAFAXINE 75 MG/1
75 TABLET ORAL 2 TIMES DAILY
Qty: 60 TAB | Refills: 2 | Status: SHIPPED | OUTPATIENT
Start: 2019-06-06 | End: 2019-07-31

## 2019-06-06 RX ORDER — TADALAFIL 10 MG/1
TABLET ORAL
Qty: 20 TAB | Refills: 5 | Status: SHIPPED | OUTPATIENT
Start: 2019-06-06 | End: 2020-05-14 | Stop reason: SDUPTHER

## 2019-06-06 NOTE — PROGRESS NOTES
HISTORY OF PRESENT ILLNESS  Yamini Saavedra is a 46 y.o. male. Visit Vitals  /80 (BP 1 Location: Right arm, BP Patient Position: Sitting)   Pulse 72   Temp 98 °F (36.7 °C) (Oral)   Resp 16   Ht 5' 7\" (1.702 m)   Wt 128 lb (58.1 kg)   SpO2 96%   BMI 20.05 kg/m²       Pt still concerned about his weight  He lost about 20 # last year. --It is now stable and is actually up 3# since last year    He is concerned that the weight fell off rapidly last year  He is concerned with losing muscle mass and having decreased energy      He also reports earlier this year a severe cough that lasted over a month. He had some greenish nasal drainage. He then developed some right pleuritic chest pain  He reports several more episodes of chest congestion and some intermittent pain in the same side      Other   The history is provided by the patient (see comments). Cough   The history is provided by the patient. This is a new problem. The current episode started more than 1 week ago. The problem occurs daily. The problem has not changed since onset. Nothing relieves the symptoms. Treatments tried: antihistamines., OTC cough meds. The treatment provided no relief. Depression   The history is provided by the patient. This is a chronic problem. The current episode started more than 1 week ago. The problem occurs daily. The problem has not changed since onset. The symptoms are aggravated by stress. Nothing relieves the symptoms. He has tried nothing for the symptoms. Review of Systems   Constitutional: Negative for chills and fever. Respiratory: Positive for cough. Cardiovascular: Negative. Genitourinary:        Low libido     Psychiatric/Behavioral: Positive for depression. The patient is nervous/anxious and has insomnia. Physical Exam   Constitutional: He is oriented to person, place, and time. He appears well-developed and well-nourished. No distress. Cardiovascular: Normal rate and regular rhythm. Pulmonary/Chest: Effort normal and breath sounds normal.   Musculoskeletal: He exhibits no edema. Neurological: He is alert and oriented to person, place, and time. Skin: Skin is warm and dry. He is not diaphoretic. Psychiatric: He has a normal mood and affect. Nursing note and vitals reviewed. ASSESSMENT and PLAN    ICD-10-CM ICD-9-CM    1. Cough R05 786.2 XR CHEST PA LAT      XR RIBS BI 3 V   2. Chest wall pain R07.89 786.52 XR CHEST PA LAT      XR RIBS BI 3 V      CK   3. Weight loss I95.1 680.77 METABOLIC PANEL, COMPREHENSIVE      CBC WITH AUTOMATED DIFF      TSH AND FREE T4      TESTOSTERONE, FREE & TOTAL      CK      CELIAC ANTIBODY PROFILE   4. Low libido R68.82 799.81 TESTOSTERONE, FREE & TOTAL      tadalafil (CIALIS) 10 mg tablet      DISCONTINUED: tadalafil (CIALIS) 10 mg tablet   5. Anxiety F41.9 300.00 venlafaxine (EFFEXOR) 75 mg tablet   6. Depression, unspecified depression type F32.9 311 venlafaxine (EFFEXOR) 75 mg tablet       Cough--? Etiology. May be allergic. Needs chest Xray and rib xray    Weight loss--actually stable. But he is obsessed about this. Needs to see GI but has no insurance. Needs CT abd--again no insurance so declines today.   Will add celiac panel    Depression and mental health issue--needs to see psych    F/u one month for recheck

## 2019-06-06 NOTE — PROGRESS NOTES
ROOM # 6    Brice Sandoval presents today for   Chief Complaint   Patient presents with    Weight Loss     follow up       Brice Sandoval preferred language for health care discussion is english/other. Is someone accompanying this pt? No    Is the patient using any DME equipment during OV? No    Depression Screening:  3 most recent PHQ Screens 6/6/2019 7/17/2018   Little interest or pleasure in doing things Several days Not at all   Feeling down, depressed, irritable, or hopeless Nearly every day Not at all   Total Score PHQ 2 4 0       Learning Assessment:  Learning Assessment 7/17/2018   PRIMARY LEARNER Patient   HIGHEST LEVEL OF EDUCATION - PRIMARY LEARNER  SOME COLLEGE   BARRIERS PRIMARY LEARNER NONE   CO-LEARNER CAREGIVER No   PRIMARY LANGUAGE ENGLISH   LEARNER PREFERENCE PRIMARY VIDEOS   ANSWERED BY Patient   RELATIONSHIP SELF       Abuse Screening:  Abuse Screening Questionnaire 7/17/2018   Do you ever feel afraid of your partner? N   Are you in a relationship with someone who physically or mentally threatens you? N   Is it safe for you to go home? Y       Fall Risk  Fall Risk Assessment, last 12 mths 7/17/2018   Able to walk? Yes   Fall in past 12 months? No       Health Maintenance reviewed and discussed per provider. Yes    Brice Sandoval is due for   Health Maintenance Due   Topic Date Due    Hepatitis C Screening  1965    Shingrix Vaccine Age 50> (1 of 2) 08/18/2014    FOBT Q 1 YEAR AGE 50-75  07/24/2019     Please order/place referral if appropriate. Advance Directive:  1. Do you have an advance directive in place? Patient Reply: No    2. If not, would you like material regarding how to put one in place? Patient Reply: No    Coordination of Care:  1. Have you been to the ER, urgent care clinic since your last visit? Hospitalized since your last visit? NO    2. Have you seen or consulted any other health care providers outside of the [address] [place] [address] since your last visit? Include any pap smears or colon screening.  NO

## 2019-06-07 LAB
GLIADIN PEPTIDE IGA SER-ACNC: 7 UNITS (ref 0–19)
GLIADIN PEPTIDE IGG SER-ACNC: 3 UNITS (ref 0–19)
IGA SERPL-MCNC: 420 MG/DL (ref 90–386)
TESTOST FREE SERPL-MCNC: 12 PG/ML (ref 7.2–24)
TESTOST SERPL-MCNC: 337 NG/DL (ref 264–916)
TTG IGA SER-ACNC: <2 U/ML (ref 0–3)
TTG IGG SER-ACNC: 4 U/ML (ref 0–5)

## 2019-07-31 ENCOUNTER — OFFICE VISIT (OUTPATIENT)
Dept: INTERNAL MEDICINE CLINIC | Age: 54
End: 2019-07-31

## 2019-07-31 VITALS
WEIGHT: 134 LBS | TEMPERATURE: 98.5 F | DIASTOLIC BLOOD PRESSURE: 76 MMHG | RESPIRATION RATE: 18 BRPM | SYSTOLIC BLOOD PRESSURE: 122 MMHG | OXYGEN SATURATION: 97 % | HEART RATE: 68 BPM | HEIGHT: 67 IN | BODY MASS INDEX: 21.03 KG/M2

## 2019-07-31 DIAGNOSIS — F32.A DEPRESSION, UNSPECIFIED DEPRESSION TYPE: ICD-10-CM

## 2019-07-31 DIAGNOSIS — F41.9 ANXIETY: Primary | ICD-10-CM

## 2019-07-31 DIAGNOSIS — S46.912A STRAIN OF LEFT SHOULDER, INITIAL ENCOUNTER: ICD-10-CM

## 2019-07-31 DIAGNOSIS — R63.4 WEIGHT LOSS: ICD-10-CM

## 2019-07-31 NOTE — PROGRESS NOTES
ROOM # 4    Jt Magaña presents today for   Chief Complaint   Patient presents with    Anxiety    Weight Loss    Depression       Jt Magaña preferred language for health care discussion is english/other. Is someone accompanying this pt? no    Is the patient using any DME equipment during [address]? no    Depression Screening:  3 most recent PHQ Screens 6/6/2019 7/17/2018   Little interest or pleasure in doing things Several days Not at all   Feeling down, depressed, irritable, or hopeless Nearly every day Not at all   Total Score PHQ 2 4 0       Learning Assessment:  Learning Assessment 7/17/2018   PRIMARY LEARNER Patient   HIGHEST LEVEL OF EDUCATION - PRIMARY LEARNER  SOME COLLEGE   BARRIERS PRIMARY LEARNER NONE   CO-LEARNER CAREGIVER No   PRIMARY LANGUAGE ENGLISH   LEARNER PREFERENCE PRIMARY VIDEOS   ANSWERED BY Patient   RELATIONSHIP SELF       Abuse Screening:  Abuse Screening Questionnaire 7/17/2018   Do you ever feel afraid of your partner? N   Are you in a relationship with someone who physically or mentally threatens you? N   Is it safe for you to go home? Y       Fall Risk  Fall Risk Assessment, last 12 mths 7/17/2018   Able to walk? Yes   Fall in past 12 months? No           Health Maintenance reviewed and discussed per provider. Yes    Jt Magaña is due for   Health Maintenance Due   Topic Date Due    Hepatitis C Screening  1965    Shingrix Vaccine Age 50> (1 of 2) 08/18/2014    FOBT Q 1 YEAR AGE 50-75  07/24/2019    DTaP/Tdap/Td series (2 - Td) 07/30/2019     Please order/place referral if appropriate. Advance Directive:  1. Do you have an advance directive in place? Patient Reply: no    2. If not, would you like material regarding how to put one in place? Patient Reply: no    Coordination of Care:  1. Have you been to the ER, urgent care clinic since your last visit? Hospitalized since your last visit? no    2.  Have you seen or consulted any other health care providers outside of the [facility] since your last visit? Include any pap smears or colon screening.  no

## 2019-07-31 NOTE — PROGRESS NOTES
HISTORY OF PRESENT ILLNESS  Darek Miramontes is a 46 y.o. male. Visit Vitals  /76 (BP 1 Location: Right arm, BP Patient Position: Sitting)   Pulse 68   Temp 98.5 °F (36.9 °C) (Oral)   Resp 18   Ht 5' 7\" (1.702 m)   Wt 134 lb (60.8 kg)   SpO2 97%   BMI 20.99 kg/m²       He took effexor about 3 weeks then developed some problems with urinary stream  He did have slight nausea and dizziness in the beginning. He did double the dose but he was not able to take it long enough to get the stress benefit    He has regained a little weight (he is happy about this). He feels he would like to try an handle the stress on his own    He is trying to exercise some to include weights. He is having some difficulty with raising left shoulder since a fall last year. Would like to see ortho    Anxiety   The history is provided by the patient (see comments). This is a chronic problem. The current episode started more than 1 week ago. The problem occurs daily. The problem has been gradually improving. The symptoms are aggravated by stress. Treatments tried: effexor. The treatment provided no relief. Review of Systems   Constitutional: Negative. Musculoskeletal:        Left shoulder impingement   Psychiatric/Behavioral: The patient is nervous/anxious. Physical Exam   Constitutional: He is oriented to person, place, and time. He appears well-developed and well-nourished. No distress. Cardiovascular: Normal rate. Pulmonary/Chest: Effort normal.   Musculoskeletal:   Left shoulder impingement   Neurological: He is alert and oriented to person, place, and time. Skin: Skin is warm and dry. He is not diaphoretic. Psychiatric: He has a normal mood and affect. Nursing note and vitals reviewed. ASSESSMENT and PLAN    ICD-10-CM ICD-9-CM    1. Anxiety F41.9 300.00    2. Depression, unspecified depression type F32.9 311    3. Weight loss R63.4 783.21    4.  Strain of left shoulder, initial encounter S46.912A 840.9 REFERRAL TO ORTHOPEDICS       Will continue to work on diet and exercise to help his stress    Will continue eating better to keep weight up    Will refer to ortho for the shoulder.     F/u 3 months for weight recheck

## 2020-05-14 ENCOUNTER — VIRTUAL VISIT (OUTPATIENT)
Dept: INTERNAL MEDICINE CLINIC | Age: 55
End: 2020-05-14

## 2020-05-14 DIAGNOSIS — R68.82 LOW LIBIDO: ICD-10-CM

## 2020-05-14 DIAGNOSIS — F41.9 ANXIETY: Primary | ICD-10-CM

## 2020-05-14 DIAGNOSIS — F32.A DEPRESSION, UNSPECIFIED DEPRESSION TYPE: ICD-10-CM

## 2020-05-14 RX ORDER — CETIRIZINE HYDROCHLORIDE 10 MG/1
CAPSULE, LIQUID FILLED ORAL
COMMUNITY

## 2020-05-14 RX ORDER — HYDROGEN PEROXIDE 3 %
SOLUTION, NON-ORAL MISCELLANEOUS DAILY
COMMUNITY

## 2020-05-14 RX ORDER — TADALAFIL 10 MG/1
TABLET ORAL
Qty: 20 TAB | Refills: 11 | Status: SHIPPED | OUTPATIENT
Start: 2020-05-14 | End: 2021-07-23 | Stop reason: SDUPTHER

## 2020-05-14 NOTE — PROGRESS NOTES
For virtual visit patient would like to receive link via TEXT/EMAIL: text    Tessy Mcnulty is a 53 y.o. male (: 1965) evaluated via telephone on 2020 to address:    Chief Complaint   Patient presents with    Anxiety     F/U    Medication Refill       Vitals:    20 0957   PainSc:   0 - No pain       Allergies Reviewed. Learning Assessment:     Learning Assessment 2018   PRIMARY LEARNER Patient   HIGHEST LEVEL OF EDUCATION - PRIMARY LEARNER  SOME COLLEGE   BARRIERS PRIMARY LEARNER NONE   CO-LEARNER CAREGIVER No   PRIMARY LANGUAGE ENGLISH   LEARNER PREFERENCE PRIMARY VIDEOS   ANSWERED BY Patient   RELATIONSHIP SELF     Depression Screening:     3 most recent PHQ Screens 2020   Little interest or pleasure in doing things Not at all   Feeling down, depressed, irritable, or hopeless Not at all   Total Score PHQ 2 0     Fall Risk Assessment:     Fall Risk Assessment, last 12 mths 2018   Able to walk? Yes   Fall in past 12 months? No     Abuse Screening:     Abuse Screening Questionnaire 2018   Do you ever feel afraid of your partner? N   Are you in a relationship with someone who physically or mentally threatens you? N   Is it safe for you to go home? Y       Coordination of Care Questionaire:   1. Have you been to the ER, urgent care clinic since your last visit? Hospitalized since your last visit? NO    2. Have you seen or consulted any other health care providers outside of the [address] [place] [address] since your last visit? Include any pap smears or colon screening. NO    Advanced Directive:   1. Do you have an Advanced Directive? NO    2. Would you like information on Advanced Directives?  NO

## 2020-05-14 NOTE — PROGRESS NOTES
Claudeen Cordoba is a 53 y.o. male who was seen by synchronous (real-time) audio-video technology on 5/14/2020. Consent: Claudeen Cordoba, who was seen by synchronous (real-time) audio-video technology, and/or his healthcare decision maker, is aware that this patient-initiated, Telehealth encounter on 5/14/2020 is a billable service, with coverage as determined by his insurance carrier. He is aware that he may receive a bill and has provided verbal consent to proceed: Yes. Assessment & Plan:   Diagnoses and all orders for this visit:    1. Anxiety    2. Depression, unspecified depression type    3. Low libido  -     tadalafiL (CIALIS) 10 mg tablet; Take one talet every three days as needed  Indications: the inability to have an erection        Looks well. Sounds well. Refill as indicated. Will be looking for new PCP nearer to his home in [place]  Subjective:   Claudeen Cordoba is a 53 y.o. male who was seen for Anxiety (F/U) and Medication Refill    Staying around 140 now on his weight  He has to push the foods. finally got a full time job in [place]. With the [employer]. Currently home. They are due to partially re-open slowly next week    Did not get to see ortho yet. Wanted to wait due to no insurance. Now he will try and wait due to the new job. Anxiety   The history is provided by the patient. This is a chronic problem. The current episode started more than 1 week ago. The problem occurs daily. The problem has not changed since onset. The symptoms are relieved by medications. Medication Refill          Prior to Admission medications    Medication Sig Start Date End Date Taking? Authorizing Provider   Cetirizine (ZyrTEC) 10 mg cap Take  by mouth. Yes Provider, Historical   esomeprazole (NexIUM) 20 mg capsule Take  by mouth daily.    Yes Provider, Historical   tadalafil (CIALIS) 10 mg tablet Take one talet every three days as needed  Indications: Inability to have an Erection 6/6/19  Yes Chelsea Cary MD   ibuprofen (MOTRIN) 200 mg tablet Take 200 mg by mouth every six (6) hours as needed for Pain. Yes Provider, Historical   acetaminophen (TYLENOL) 500 mg tablet Take  by mouth every six (6) hours as needed for Pain. Provider, Historical   emtricitabine-tenofovir, TDF, (TRUVADA) 200-300 mg per tablet Take 1 Tab by mouth daily. Provider, Historical     No Known Allergies    Patient Active Problem List    Diagnosis Date Noted    Weight loss 08/21/2018    Gastroesophageal reflux disease 08/21/2018     Past Medical History:   Diagnosis Date    Acquired deformity of toe     Anal fissure     Basal cell carcinoma 2016    Skin cancer on nose    Finger pain     Hemorrhoid     Shoulder pain, left 05/2015    fell off a shed and landed on left shoulder       Review of Systems   Constitutional: Negative for chills and fever. Gastrointestinal: Positive for heartburn. Musculoskeletal: Positive for back pain (lower left. Sometimes into the hip and buttocks. Occasionally burning. ). Psychiatric/Behavioral: Negative for depression. The patient is not nervous/anxious and does not have insomnia. Objective: There were no vitals taken for this visit. General: alert, cooperative, no distress   Mental  status: normal mood, behavior, speech, dress, motor activity, and thought processes, able to follow commands   HENT: NCAT   Neck: no visualized mass   Resp: no respiratory distress   Neuro: no gross deficits   Skin: no discoloration or lesions of concern on visible areas   Psychiatric: normal affect, consistent with stated mood, no evidence of hallucinations     Additional exam findings: We discussed the expected course, resolution and complications of the diagnosis(es) in detail. Medication risks, benefits, costs, interactions, and alternatives were discussed as indicated. I advised him to contact the office if his condition worsens, changes or fails to improve as anticipated. He expressed understanding with the diagnosis(es) and plan. Claudeen Cordoba is a 53 y.o. male who was evaluated by a video visit encounter for concerns as above. Patient identification was verified prior to start of the visit. A caregiver was present when appropriate. Due to this being a TeleHealth encounter (During [facility] public health emergency), evaluation of the following organ systems was limited: Vitals/Constitutional/EENT/Resp/CV/GI//MS/Neuro/Skin/Heme-Lymph-Imm. Pursuant to the emergency declaration under the [facility], [facility] waiver authority and the [employer] Act, this Virtual  Visit was conducted, with patient's (and/or legal guardian's) consent, to reduce the patient's risk of exposure to COVID-19 and provide necessary medical care. Services were provided through a video synchronous discussion virtually to substitute for in-person clinic visit. Patient and provider were located at their individual homes.       Liam Morales MD

## 2021-07-23 ENCOUNTER — OFFICE VISIT (OUTPATIENT)
Dept: INTERNAL MEDICINE CLINIC | Age: 56
End: 2021-07-23
Payer: COMMERCIAL

## 2021-07-23 VITALS
WEIGHT: 144 LBS | TEMPERATURE: 97.2 F | SYSTOLIC BLOOD PRESSURE: 125 MMHG | BODY MASS INDEX: 22.6 KG/M2 | DIASTOLIC BLOOD PRESSURE: 80 MMHG | HEIGHT: 67 IN | RESPIRATION RATE: 18 BRPM | OXYGEN SATURATION: 96 % | HEART RATE: 67 BPM

## 2021-07-23 DIAGNOSIS — R68.82 LOW LIBIDO: Primary | ICD-10-CM

## 2021-07-23 DIAGNOSIS — R39.9 LOWER URINARY TRACT SYMPTOMS (LUTS): ICD-10-CM

## 2021-07-23 DIAGNOSIS — Z76.0 MEDICATION REFILL: ICD-10-CM

## 2021-07-23 PROCEDURE — 99213 OFFICE O/P EST LOW 20 MIN: CPT | Performed by: INTERNAL MEDICINE

## 2021-07-23 RX ORDER — TADALAFIL 10 MG/1
TABLET ORAL
Qty: 20 TABLET | Refills: 11 | Status: SHIPPED | OUTPATIENT
Start: 2021-07-23

## 2021-07-23 NOTE — PROGRESS NOTES
HISTORY OF PRESENT ILLNESS  Maribeth Chatman is a 63 y.o. male. /80 (BP 1 Location: Right lower arm, BP Patient Position: Sitting, BP Cuff Size: Adult)   Pulse 67   Temp 97.2 °F (36.2 °C) (Temporal)   Resp 18   Ht 5' 7\" (1.702 m)   Wt 144 lb (65.3 kg)   SpO2 96%   BMI 22.55 kg/m²     Still working in [place] most of the time    Medication Refill  The history is provided by the patient. This is a new problem. Review of Systems   Genitourinary:        Has some Lower urinary tract sxs. Sometimes has to void before he really feels it. Finds more frequency and small amounts. No burning or stinging   Psychiatric/Behavioral:        Reports stress is much better. Still some mild sleep issues. More trouble staying asleep (this may be related to prostate sxs)       Physical Exam  Vitals and nursing note reviewed. Constitutional:       General: He is not in acute distress. Appearance: Normal appearance. He is well-developed. He is not diaphoretic. HENT:      Head: Normocephalic and atraumatic. Right Ear: Tympanic membrane and ear canal normal.      Left Ear: Tympanic membrane and ear canal normal.   Cardiovascular:      Rate and Rhythm: Normal rate and regular rhythm. Pulmonary:      Effort: Pulmonary effort is normal.      Breath sounds: Normal breath sounds. Musculoskeletal:      Right lower leg: No edema. Left lower leg: No edema. Skin:     General: Skin is warm and dry. Neurological:      Mental Status: He is alert and oriented to person, place, and time. Psychiatric:         Mood and Affect: Mood normal.         Behavior: Behavior normal.         ASSESSMENT and PLAN    ICD-10-CM ICD-9-CM    1. Low libido  R68.82 799.81 tadalafiL (CIALIS) 10 mg tablet   2. Lower urinary tract symptoms (LUTS)  R39.9 788.99    3.  Medication refill  Z76.0 V68.1        Refill as noted    Reviewed [facility] vaccines with pt  reviewed due for CRC screen--will pursue with new PCP    F/u prn

## 2021-07-23 NOTE — PROGRESS NOTES
Paula Manrique is a 63 y.o. male (: 1965) presenting to address:    Chief Complaint   Patient presents with    Medication Refill       Vitals:    21 1120   BP: 125/80   Pulse: 67   Resp: 18   Temp: 97.2 °F (36.2 °C)   TempSrc: Temporal   SpO2: 96%   Weight: 144 lb (65.3 kg)   Height: 7' 7\" (2.311 m)   PainSc:   0 - No pain       Hearing/Vision:   No exam data present    Learning Assessment:     Learning Assessment 2018   PRIMARY LEARNER Patient   HIGHEST LEVEL OF EDUCATION - PRIMARY LEARNER  SOME COLLEGE   BARRIERS PRIMARY LEARNER NONE   CO-LEARNER CAREGIVER No   PRIMARY LANGUAGE ENGLISH   LEARNER PREFERENCE PRIMARY VIDEOS   ANSWERED BY Patient   RELATIONSHIP SELF     Depression Screening:     3 most recent PHQ Screens 2021   Little interest or pleasure in doing things Not at all   Feeling down, depressed, irritable, or hopeless Not at all   Total Score PHQ 2 0     Fall Risk Assessment:     Fall Risk Assessment, last 12 mths 2018   Able to walk? Yes   Fall in past 12 months? No     Abuse Screening:     Abuse Screening Questionnaire 2018   Do you ever feel afraid of your partner? N   Are you in a relationship with someone who physically or mentally threatens you? N   Is it safe for you to go home? Y     Coordination of Care Questionaire:   1. Have you been to the ER, urgent care clinic since your last visit? Hospitalized since your last visit? NO    2. Have you seen or consulted any other health care providers outside of the [address] [place] [address] since your last visit? Include any pap smears or colon screening. NO    Advanced Directive:   1. Do you have an Advanced Directive? NO    2. Would you like information on Advanced Directives?  NO

## 2022-10-15 NOTE — PROGRESS NOTES
HISTORY OF PRESENT ILLNESS  Yamini Saavedra is a 47 y.o. male. Visit Vitals    /85 (BP 1 Location: Right arm, BP Patient Position: Sitting)    Pulse 68    Temp 97.7 °F (36.5 °C) (Oral)    Resp 18    Ht 5' 7\" (1.702 m)    Wt 125 lb 3.2 oz (56.8 kg)    SpO2 99%    BMI 19.61 kg/m2       HPI Comments: Unclear of TB status    Establish Care   The history is provided by the patient. This is a new problem. Weight Loss   The history is provided by the patient (15 # weight loss over 4-5 months. HIV negative in May. So now on PreP due to partner's status. Weight loss pre-dates his truvada). This is a new problem. The current episode started more than 1 week ago. Sweats    The history is provided by the patient. This is a new problem. The current episode started more than 1 week ago. The problem occurs every several days. The problem has not changed since onset. Associated symptoms include cough (end of last year, nuisance cough. But has resolved ?). Review of Systems   Constitutional: Positive for diaphoresis (intermittent night sweats over the last several years. ). Negative for chills and fever. Respiratory: Positive for cough (end of last year, nuisance cough. But has resolved ?). Physical Exam   Constitutional: He is oriented to person, place, and time. He appears well-developed and well-nourished. He does not have a sickly appearance. He does not appear ill. No distress. HENT:   Head: Normocephalic and atraumatic. Right Ear: External ear and ear canal normal. No drainage or tenderness. No mastoid tenderness. Tympanic membrane is not erythematous. Left Ear: External ear and ear canal normal. No drainage or tenderness. No mastoid tenderness. Tympanic membrane is not erythematous. Nose: Nose normal. No mucosal edema, rhinorrhea or sinus tenderness. No epistaxis. Right sinus exhibits no maxillary sinus tenderness and no frontal sinus tenderness.  Left sinus exhibits no maxillary sinus tenderness and no frontal sinus tenderness. Mouth/Throat: Uvula is midline, oropharynx is clear and moist and mucous membranes are normal. No oropharyngeal exudate, posterior oropharyngeal edema or posterior oropharyngeal erythema. Eyes: Conjunctivae and EOM are normal. Pupils are equal, round, and reactive to light. Right eye exhibits no discharge. Left eye exhibits no discharge. No scleral icterus. Neck: Normal range of motion and phonation normal. Neck supple. No JVD present. Carotid bruit is not present. No tracheal deviation and no edema present. No thyroid mass and no thyromegaly present. Cardiovascular: Normal rate, regular rhythm, normal heart sounds and intact distal pulses. Exam reveals no gallop and no friction rub. No murmur heard. Pulmonary/Chest: Effort normal and breath sounds normal. No respiratory distress. He has no wheezes. He has no rales. He exhibits no tenderness. Abdominal: Soft. Bowel sounds are normal. He exhibits no distension, no abdominal bruit, no pulsatile midline mass and no mass. There is no hepatosplenomegaly. There is no tenderness. There is no rebound, no guarding and no CVA tenderness. Hernia confirmed negative in the right inguinal area and confirmed negative in the left inguinal area. Genitourinary: Penis normal. Right testis shows no mass, no swelling and no tenderness. Left testis shows no mass, no swelling and no tenderness. Circumcised. Genitourinary Comments: Testes slightly small   Musculoskeletal: Normal range of motion. He exhibits no edema or tenderness. Lymphadenopathy:     He has no cervical adenopathy. Neurological: He is alert and oriented to person, place, and time. He has normal reflexes. He displays no tremor. No cranial nerve deficit. He exhibits normal muscle tone. Coordination normal.        Skin: Skin is warm and dry. No rash noted. He is not diaphoretic. No cyanosis or erythema. Nails show no clubbing.    Psychiatric: He has a normal mood and affect. His behavior is normal. Judgment and thought content normal.   Nursing note and vitals reviewed. ASSESSMENT and PLAN    ICD-10-CM ICD-9-CM    1. Weight loss O41.2 990.26 METABOLIC PANEL, COMPREHENSIVE      LIPID PANEL      TSH AND FREE T4      QUANTIFERON TB GOLD(CLIENT INCUB.)      XR CHEST PA LAT   2. Night sweats R61 780.8 CBC WITH AUTOMATED DIFF      TSH AND FREE T4      QUANTIFERON TB GOLD(CLIENT INCUB.)      XR CHEST PA LAT   3. Decreased libido R68.82 799.81 TSH AND FREE T4      TESTOSTERONE, FREE & TOTAL      sildenafil citrate (VIAGRA) 100 mg tablet   4. Cough R05 786.2 XR CHEST PA LAT   5. Screen for colon cancer Z12.11 V76.51 OCCULT BLOOD, IMMUNOASSAY (FIT)       Past medical history, surgical history, family history, and social history reviewed with patient    Will check lab for weight loss and other systemic illness  CXR for weight loss    Check testoterone level.  This could account for some weight loss (muscle mass) and decreased libido and erections  Trial viagra    F/u one month for recheck of weight and to review med changes Imaging Studies

## 2023-02-07 ENCOUNTER — OFFICE VISIT (OUTPATIENT)
Dept: INTERNAL MEDICINE CLINIC | Age: 58
End: 2023-02-07
Payer: COMMERCIAL

## 2023-02-07 ENCOUNTER — HOSPITAL ENCOUNTER (OUTPATIENT)
Dept: LAB | Age: 58
Discharge: HOME | End: 2023-02-07
Payer: COMMERCIAL

## 2023-02-07 VITALS
HEART RATE: 62 BPM | WEIGHT: 143 LBS | DIASTOLIC BLOOD PRESSURE: 84 MMHG | TEMPERATURE: 96.6 F | BODY MASS INDEX: 22.44 KG/M2 | OXYGEN SATURATION: 99 % | SYSTOLIC BLOOD PRESSURE: 132 MMHG | HEIGHT: 67 IN | RESPIRATION RATE: 15 BRPM

## 2023-02-07 DIAGNOSIS — Z13.220 SCREENING FOR LIPID DISORDERS: ICD-10-CM

## 2023-02-07 DIAGNOSIS — Z13.0 SCREENING FOR DEFICIENCY ANEMIA: ICD-10-CM

## 2023-02-07 DIAGNOSIS — Z12.11 SCREEN FOR COLON CANCER: ICD-10-CM

## 2023-02-07 DIAGNOSIS — Z11.4 SCREENING FOR HIV (HUMAN IMMUNODEFICIENCY VIRUS): ICD-10-CM

## 2023-02-07 DIAGNOSIS — Z00.00 ROUTINE GENERAL MEDICAL EXAMINATION AT A HEALTH CARE FACILITY: Primary | ICD-10-CM

## 2023-02-07 DIAGNOSIS — Z11.59 NEED FOR HEPATITIS C SCREENING TEST: ICD-10-CM

## 2023-02-07 DIAGNOSIS — Z00.00 ROUTINE GENERAL MEDICAL EXAMINATION AT A HEALTH CARE FACILITY: ICD-10-CM

## 2023-02-07 DIAGNOSIS — Z12.5 SCREENING FOR PROSTATE CANCER: ICD-10-CM

## 2023-02-07 DIAGNOSIS — Z13.1 SCREENING FOR DIABETES MELLITUS: ICD-10-CM

## 2023-02-07 DIAGNOSIS — Z13.89 SCREENING FOR GENITOURINARY CONDITION: ICD-10-CM

## 2023-02-07 DIAGNOSIS — R68.82 LOW LIBIDO: ICD-10-CM

## 2023-02-07 LAB
ALBUMIN SERPL-MCNC: 4.1 G/DL (ref 3.4–5)
ALBUMIN/GLOB SERPL: 1.2 (ref 0.8–1.7)
ALP SERPL-CCNC: 70 U/L (ref 45–117)
ALT SERPL-CCNC: 34 U/L (ref 16–61)
ANION GAP SERPL CALC-SCNC: 5 MMOL/L (ref 3–18)
APPEARANCE UR: CLEAR
AST SERPL-CCNC: 21 U/L (ref 10–38)
BASOPHILS # BLD: 0 K/UL (ref 0–0.1)
BASOPHILS NFR BLD: 1 % (ref 0–2)
BILIRUB SERPL-MCNC: 0.9 MG/DL (ref 0.2–1)
BILIRUB UR QL: NEGATIVE
BUN SERPL-MCNC: 17 MG/DL (ref 7–18)
BUN/CREAT SERPL: 19 (ref 12–20)
CALCIUM SERPL-MCNC: 9 MG/DL (ref 8.5–10.1)
CHLORIDE SERPL-SCNC: 103 MMOL/L (ref 100–111)
CHOLEST SERPL-MCNC: 155 MG/DL
CO2 SERPL-SCNC: 30 MMOL/L (ref 21–32)
COLOR UR: YELLOW
CREAT SERPL-MCNC: 0.88 MG/DL (ref 0.6–1.3)
DIFFERENTIAL METHOD BLD: ABNORMAL
EOSINOPHIL # BLD: 0.2 K/UL (ref 0–0.4)
EOSINOPHIL NFR BLD: 3 % (ref 0–5)
ERYTHROCYTE [DISTWIDTH] IN BLOOD BY AUTOMATED COUNT: 12 % (ref 11.6–14.5)
GLOBULIN SER CALC-MCNC: 3.3 G/DL (ref 2–4)
GLUCOSE SERPL-MCNC: 91 MG/DL (ref 74–99)
GLUCOSE UR STRIP.AUTO-MCNC: NEGATIVE MG/DL
HCT VFR BLD AUTO: 43.6 % (ref 36–48)
HDLC SERPL-MCNC: 61 MG/DL (ref 40–60)
HDLC SERPL: 2.5 (ref 0–5)
HGB BLD-MCNC: 15.3 G/DL (ref 13–16)
HGB UR QL STRIP: NEGATIVE
IMM GRANULOCYTES # BLD AUTO: 0 K/UL (ref 0–0.04)
IMM GRANULOCYTES NFR BLD AUTO: 0 % (ref 0–0.5)
KETONES UR QL STRIP.AUTO: NEGATIVE MG/DL
LDLC SERPL CALC-MCNC: 79.4 MG/DL (ref 0–100)
LEUKOCYTE ESTERASE UR QL STRIP.AUTO: NEGATIVE
LIPID PROFILE,FLP: ABNORMAL
LYMPHOCYTES # BLD: 3.2 K/UL (ref 0.9–3.6)
LYMPHOCYTES NFR BLD: 52 % (ref 21–52)
MCH RBC QN AUTO: 33.3 PG (ref 24–34)
MCHC RBC AUTO-ENTMCNC: 35.1 G/DL (ref 31–37)
MCV RBC AUTO: 94.8 FL (ref 78–100)
MONOCYTES # BLD: 0.7 K/UL (ref 0.05–1.2)
MONOCYTES NFR BLD: 11 % (ref 3–10)
NEUTS SEG # BLD: 2 K/UL (ref 1.8–8)
NEUTS SEG NFR BLD: 33 % (ref 40–73)
NITRITE UR QL STRIP.AUTO: NEGATIVE
NRBC # BLD: 0 K/UL (ref 0–0.01)
NRBC BLD-RTO: 0 PER 100 WBC
PH UR STRIP: 6.5 (ref 5–8)
PLATELET # BLD AUTO: 245 K/UL (ref 135–420)
PMV BLD AUTO: 10.5 FL (ref 9.2–11.8)
POTASSIUM SERPL-SCNC: 4 MMOL/L (ref 3.5–5.5)
PROT SERPL-MCNC: 7.4 G/DL (ref 6.4–8.2)
PROT UR STRIP-MCNC: NEGATIVE MG/DL
PSA SERPL-MCNC: 1.6 NG/ML (ref 0–4)
RBC # BLD AUTO: 4.6 M/UL (ref 4.35–5.65)
SODIUM SERPL-SCNC: 138 MMOL/L (ref 136–145)
SP GR UR REFRACTOMETRY: 1.02 (ref 1–1.03)
TRIGL SERPL-MCNC: 73 MG/DL (ref ?–150)
UROBILINOGEN UR QL STRIP.AUTO: 0.2 EU/DL (ref 0.2–1)
VLDLC SERPL CALC-MCNC: 14.6 MG/DL
WBC # BLD AUTO: 6.1 K/UL (ref 4.6–13.2)

## 2023-02-07 PROCEDURE — 99396 PREV VISIT EST AGE 40-64: CPT | Performed by: INTERNAL MEDICINE

## 2023-02-07 PROCEDURE — 80053 COMPREHEN METABOLIC PANEL: CPT

## 2023-02-07 PROCEDURE — 36415 COLL VENOUS BLD VENIPUNCTURE: CPT

## 2023-02-07 PROCEDURE — 87389 HIV-1 AG W/HIV-1&-2 AB AG IA: CPT

## 2023-02-07 PROCEDURE — 80061 LIPID PANEL: CPT

## 2023-02-07 PROCEDURE — 81003 URINALYSIS AUTO W/O SCOPE: CPT

## 2023-02-07 PROCEDURE — 86803 HEPATITIS C AB TEST: CPT

## 2023-02-07 PROCEDURE — 84153 ASSAY OF PSA TOTAL: CPT

## 2023-02-07 PROCEDURE — 85025 COMPLETE CBC W/AUTO DIFF WBC: CPT

## 2023-02-07 RX ORDER — TADALAFIL 10 MG/1
TABLET ORAL
Qty: 20 TABLET | Refills: 11 | Status: SHIPPED | OUTPATIENT
Start: 2023-02-07

## 2023-02-07 NOTE — PROGRESS NOTES
HISTORY OF PRESENT ILLNESS  Dara Grimm is a 61 y.o. male. /84 (BP 1 Location: Right arm, BP Patient Position: Sitting, BP Cuff Size: Adult)   Pulse 62   Temp (!) 96.6 °F (35.9 °C) (Temporal)   Resp 15   Ht 5' 7\" (1.702 m)   Wt 143 lb (64.9 kg)   SpO2 99%   BMI 22.40 kg/m²     Physical  The history is provided by the Patient. This is a new problem. Review of Systems   Constitutional: Negative. HENT: Negative. Respiratory: Negative. Cardiovascular: Negative. Gastrointestinal: Negative. Genitourinary: Negative. Musculoskeletal: Negative. Neurological: Negative. Physical Exam  Vitals and nursing note reviewed. Constitutional:       General: He is not in acute distress. Appearance: Normal appearance. He is well-developed. He is not ill-appearing or diaphoretic. HENT:      Head: Normocephalic and atraumatic. Right Ear: Tympanic membrane, ear canal and external ear normal. No drainage or tenderness. No mastoid tenderness. Tympanic membrane is not erythematous. Left Ear: Tympanic membrane, ear canal and external ear normal. No drainage or tenderness. No mastoid tenderness. Tympanic membrane is not erythematous. Nose: Nose normal. No mucosal edema or rhinorrhea. Right Sinus: No maxillary sinus tenderness or frontal sinus tenderness. Left Sinus: No maxillary sinus tenderness or frontal sinus tenderness. Mouth/Throat:      Mouth: Mucous membranes are moist.      Pharynx: Uvula midline. No oropharyngeal exudate or posterior oropharyngeal erythema. Eyes:      General: No scleral icterus. Right eye: No discharge. Left eye: No discharge. Conjunctiva/sclera: Conjunctivae normal.      Pupils: Pupils are equal, round, and reactive to light. Neck:      Thyroid: No thyroid mass or thyromegaly. Vascular: No carotid bruit or JVD. Trachea: Phonation normal. No tracheal deviation.    Cardiovascular:      Rate and Rhythm: Normal rate and regular rhythm. Heart sounds: Normal heart sounds. No murmur heard. No friction rub. No gallop. Pulmonary:      Effort: Pulmonary effort is normal. No respiratory distress. Breath sounds: Normal breath sounds. No wheezing or rales. Chest:      Chest wall: No tenderness. Abdominal:      General: Bowel sounds are normal. There is no distension or abdominal bruit. Palpations: Abdomen is soft. There is no mass or pulsatile mass. Tenderness: There is no abdominal tenderness. There is no guarding or rebound. Hernia: There is no hernia in the left inguinal area or right inguinal area. Genitourinary:     Penis: Normal and circumcised. Testes:         Right: Mass not present. Left: Mass not present. Prostate: Enlarged (slightly). Musculoskeletal:         General: No tenderness. Normal range of motion. Cervical back: Normal range of motion and neck supple. No edema. Right lower leg: No edema. Left lower leg: No edema. Lymphadenopathy:      Cervical: No cervical adenopathy. Skin:     General: Skin is warm and dry. Findings: No erythema or rash. Nails: There is no clubbing. Neurological:      General: No focal deficit present. Mental Status: He is alert and oriented to person, place, and time. Cranial Nerves: No cranial nerve deficit. Motor: No tremor or abnormal muscle tone. Coordination: Coordination normal.      Deep Tendon Reflexes: Reflexes are normal and symmetric. Reflexes normal.      Comments:      Psychiatric:         Mood and Affect: Mood normal.         Behavior: Behavior normal.         Thought Content: Thought content normal.         Judgment: Judgment normal.       ASSESSMENT and PLAN    ICD-10-CM ICD-9-CM    1.  Routine general medical examination at a health care facility  R64.31 V09.6 METABOLIC PANEL, COMPREHENSIVE      LIPID PANEL      CBC WITH AUTOMATED DIFF      PSA SCREENING (SCREENING)      URINALYSIS W/ RFLX MICROSCOPIC      HEPATITIS C AB      HIV 1/2 AG/AB, 4TH GENERATION,W RFLX CONFIRM      2. Screening for lipid disorders  Z13.220 V77.91 LIPID PANEL      3. Screening for diabetes mellitus  M62.5 F72.6 METABOLIC PANEL, COMPREHENSIVE      4. Screening for genitourinary condition  Z13.89 V81.6 URINALYSIS W/ RFLX MICROSCOPIC      5. Need for hepatitis C screening test  Z11.59 V73.89 HEPATITIS C AB      6. Screening for prostate cancer  Z12.5 V76.44 PSA SCREENING (SCREENING)      7. Screening for deficiency anemia  Z13.0 V78.1 CBC WITH AUTOMATED DIFF      8. Low libido  R68.82 799.81 tadalafiL (CIALIS) 10 mg tablet      9. Screening for HIV (human immunodeficiency virus)  Z11.4 V73.89 HIV 1/2 AG/AB, 4TH GENERATION,W RFLX CONFIRM      10.  Screen for colon cancer  Z12.11 V76.51 REFERRAL TO GASTROENTEROLOGY        Doing well  Update lab  F/u annually or prn

## 2023-02-07 NOTE — PROGRESS NOTES
1. \"Have you been to the ER, urgent care clinic since your last visit? Hospitalized since your last visit? \" Yes [facility] 9/2022 for COVID-19    2. \"Have you seen or consulted any other health care providers outside of the [address] [place] [address] since your last visit? \" No     3. For patients aged 39-70: Has the patient had a colonoscopy / FIT/ Cologuard? No      If the patient is female:    4. For patients aged 41-77: Has the patient had a mammogram within the past 2 years? NA - based on age or sex      11. For patients aged 21-65: Has the patient had a pap smear?  NA - based on age or sex

## 2023-02-08 LAB
HCV AB SER IA-ACNC: <0.02 INDEX
HCV AB SERPL QL IA: NEGATIVE
HCV COMMENT,HCGAC: NORMAL
HIV 1+2 AB+HIV1 P24 AG SERPL QL IA: NONREACTIVE
HIV12 RESULT COMMENT, HHIVC: NORMAL